# Patient Record
Sex: MALE | Race: WHITE
[De-identification: names, ages, dates, MRNs, and addresses within clinical notes are randomized per-mention and may not be internally consistent; named-entity substitution may affect disease eponyms.]

---

## 2017-12-11 ENCOUNTER — HOSPITAL ENCOUNTER (EMERGENCY)
Dept: HOSPITAL 46 - ED | Age: 37
Discharge: HOME | End: 2017-12-11
Payer: COMMERCIAL

## 2017-12-11 VITALS — BODY MASS INDEX: 28 KG/M2 | HEIGHT: 71 IN | WEIGHT: 200 LBS

## 2017-12-11 DIAGNOSIS — F17.200: ICD-10-CM

## 2017-12-11 DIAGNOSIS — Z79.899: ICD-10-CM

## 2017-12-11 DIAGNOSIS — R07.2: Primary | ICD-10-CM

## 2017-12-11 DIAGNOSIS — Z79.891: ICD-10-CM

## 2017-12-11 NOTE — XMS
Clinical Summary
  Created on: 2017
 
 Jorge London
 External Reference #: [id number]
 : 82
 Sex: Male
 
 Demographics
 
 
+-----------------------+------------------------+
| Address               |  [address]             |
|                       |  [place]  [address]  |
+-----------------------+------------------------+
| Home Phone            | [phone]        |
+-----------------------+------------------------+
| Preferred Language    | Unknown                |
+-----------------------+------------------------+
| Marital Status        |                 |
+-----------------------+------------------------+
| Jain Affiliation | Unknown                |
+-----------------------+------------------------+
| Race                  | White                  |
+-----------------------+------------------------+
| Ethnic Group          | Not  or  |
+-----------------------+------------------------+
 
 
 Author
 
 
+--------------+-----------------------+
| Author       | [facility] |
+--------------+-----------------------+
| Organization | [facility] |
+--------------+-----------------------+
| Address      | Unknown               |
+--------------+-----------------------+
| Phone        | Unavailable           |
+--------------+-----------------------+
 
 
 
 Support
 
 
+------+--------------+---------+-----------------+
| Name | Relationship | Address | Phone           |
+------+--------------+---------+-----------------+
 ECON         | Unknown | [phone] |
+------+--------------+---------+-----------------+
 
 
 
 Care Team Providers
 
 
 
+-----------------------+------+-------+
| Care Team Member Name | Role | Phone |
+-----------------------+------+-------+
| Nelson Stroud MD   | PP   | tel   |
+-----------------------+------+-------+
 
 
 
 Source Comments
 [facility] is fully live on both [facility] and [facility]
 
 Allergies
 No Known Allergies
 
 Current Medications
 
 
+----------------------+----------------------+-------+---------+------+------+-------+
| Prescription         | Sig.                 | Disp. | Refills | Star | End  | Statu |
|                      |                      |       |         | t    | Date | s     |
|                      |                      |       |         | Date |      |       |
+----------------------+----------------------+-------+---------+------+------+-------+
|   TRAZODONE HCL      | Take 1 Tab by mouth  |       |         |      |      | Activ |
| (TRAZODONE ORAL)     | once daily.          |       |         |      |      | e     |
+----------------------+----------------------+-------+---------+------+------+-------+
|   cyclobenzaprine    | Take 10 mg by mouth  |       |         |      |      | Activ |
| (FLEXERIL) 10 mg     | two times daily. Do  |       |         |      |      | e     |
| Oral Tablet          | not use longer than  |       |         |      |      |       |
|                      | 2-3 weeks.           |       |         |      |      |       |
+----------------------+----------------------+-------+---------+------+------+-------+
|                      | Take 1-2 Tabs by     |       |         |      |      | Activ |
| oxyCODONE-acetaminop | mouth once daily.    |       |         |      |      | e     |
| hen 5-325 mg Oral    | Not to exceed 12     |       |         |      |      |       |
| Tablet               | tablets per any 24   |       |         |      |      |       |
|                      | hour period.         |       |         |      |      |       |
+----------------------+----------------------+-------+---------+------+------+-------+
|   MEDICATION HELP    | Patient is going to  |       |         |      |      | Activ |
|                      | bring in a list of   |       |         |      |      | e     |
|                      | medications at next  |       |         |      |      |       |
|                      | appointment.         |       |         |      |      |       |
+----------------------+----------------------+-------+---------+------+------+-------+
 
 
 
 Active Problems
 No known active problems
 
 Family History
 
 
+----------+------+--------+----------+
| Relation | Name | Status | Comments |
+----------+------+--------+----------+
| Father   |      | Alive  |          |
+----------+------+--------+----------+
| Mother   |      | Alive  |          |
+----------+------+--------+----------+
 
 
 
 
 Social History
 
 
+--------------+-------+-----------+--------+------+
| Tobacco Use  | Types | Packs/Day | Years  | Date |
|              |       |           | Used   |      |
+--------------+-------+-----------+--------+------+
| Never Smoker |       |           |        |      |
+--------------+-------+-----------+--------+------+
 
 
 
+---------------------+---+---+---+
| Smokeless Tobacco:  |   |   |   |
| Never Used          |   |   |   |
+---------------------+---+---+---+
 
 
 
+-------------+-----------+---------+----------+
| Alcohol Use | Drinks/We | oz/Week | Comments |
|             | ek        |         |          |
+-------------+-----------+---------+----------+
| No          |           |         |          |
+-------------+-----------+---------+----------+
 
 
 
+------------------+---------------+
| Sex Assigned at  | Date Recorded |
| Birth            |               |
+------------------+---------------+
| Not on file      |               |
+------------------+---------------+
 
 
 
 Last Filed Vital Signs
 
 
+-------------------+---------------------+-------------------------+
| Vital Sign        | Reading             | Time Taken              |
+-------------------+---------------------+-------------------------+
| Blood Pressure    | 167/118             | 2011  1:16 PM PST |
+-------------------+---------------------+-------------------------+
| Pulse             | 108                 | 2011  1:16 PM PST |
+-------------------+---------------------+-------------------------+
| Temperature       | 36.4   C (97.6   F) | 2011  1:16 PM PST |
+-------------------+---------------------+-------------------------+
| Respiratory Rate  | 12                  | 2011  1:16 PM PST |
+-------------------+---------------------+-------------------------+
| Oxygen Saturation | -                   | -                       |
+-------------------+---------------------+-------------------------+
| Inhaled Oxygen    | -                   | -                       |
| Concentration     |                     |                         |
+-------------------+---------------------+-------------------------+
| Weight            | 103.8 kg (228 lb    | 2011  1:16 PM PST |
|                   | 14.4 oz)            |                         |
+-------------------+---------------------+-------------------------+
 
| Height            | 180.3 cm (5' 11")   | 2011  1:16 PM PST |
+-------------------+---------------------+-------------------------+
| Body Mass Index   | 31.93               | 2011  1:16 PM PST |
+-------------------+---------------------+-------------------------+
 
 
 
 Plan of Treatment
 
 
+--------------------+-----------+-----------+----------+
| Health Maintenance | Due Date  | Last Done | Comments |
+--------------------+-----------+-----------+----------+
| INFLUENZA VACCINE  |  |           |          |
| (FLU SHOT)         | 7         |           |          |
+--------------------+-----------+-----------+----------+
 
 
 
 Results
 Not on filefrom Last 3 Months

## 2018-02-26 ENCOUNTER — HOSPITAL ENCOUNTER (EMERGENCY)
Dept: HOSPITAL 46 - ED | Age: 38
Discharge: HOME | End: 2018-02-26
Payer: COMMERCIAL

## 2018-02-26 VITALS — HEIGHT: 71 IN | BODY MASS INDEX: 28 KG/M2 | WEIGHT: 200 LBS

## 2018-02-26 DIAGNOSIS — M79.662: Primary | ICD-10-CM
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
## 2019-12-03 NOTE — EKG
[facility]
                                    [address]
                                  [place], [place]  [address]
_________________________________________________________________________________________
                                                                 Signed   
 
 
Normal sinus rhythm
Normal ECG
No previous ECGs available
Confirmed by GOOD SIMMONS DO ([id number]) on 12/3/2019 7:47:02 PM
 
 
 
 
 
 
 
 
 
 
 
 
 
 
 
 
 
 
 
 
 
 
 
 
 
 
 
 
 
 
 
 
 
 
 
 
 
 
 
 
 
    Electronically Signed By: GOOD SIMMONS DO  12/03/19 1947
_________________________________________________________________________________________
PATIENT NAME:     TIFFANIE MCELROY                  
MEDICAL RECORD #: [id number]                     Electrocardiogram             
          ACCT #: [id number]  
DATE OF BIRTH:   [birth year]                                       
PHYSICIAN:   GOOD SIMMONS DO                     REPORT #: [id number]
REPORT IS CONFIDENTIAL AND NOT TO BE RELEASED WITHOUT AUTHORIZATION

## 2019-12-04 NOTE — EKG
[facility]
                                    [address]
                                  [place], [place]  [address]
_________________________________________________________________________________________
                                                                 Signed   
 
 
Normal sinus rhythm
Normal ECG
When compared with ECG of 03-DEC-2019 18:58,
T wave amplitude has decreased in Anterior leads
Confirmed by GOOD SIMMONS DO ([id number]) on 12/4/2019 5:49:59 PM
 
 
 
 
 
 
 
 
 
 
 
 
 
 
 
 
 
 
 
 
 
 
 
 
 
 
 
 
 
 
 
 
 
 
 
 
 
 
 
 
    Electronically Signed By: GOOD SIMMONS DO  12/04/19 1750
_________________________________________________________________________________________
PATIENT NAME:     TIFFANIE MCELROY JUAN                  
MEDICAL RECORD #: [id number]                     Electrocardiogram             
          ACCT #: [id number]  
DATE OF BIRTH:   [birth year]                                       
PHYSICIAN:   GOOD SIMMONS DO                     REPORT #: [id number]
REPORT IS CONFIDENTIAL AND NOT TO BE RELEASED WITHOUT AUTHORIZATION
